# Patient Record
Sex: MALE | Race: WHITE | HISPANIC OR LATINO | URBAN - METROPOLITAN AREA
[De-identification: names, ages, dates, MRNs, and addresses within clinical notes are randomized per-mention and may not be internally consistent; named-entity substitution may affect disease eponyms.]

---

## 2020-11-09 ENCOUNTER — LAB OUTSIDE AN ENCOUNTER (OUTPATIENT)
Dept: URBAN - METROPOLITAN AREA CLINIC 54 | Facility: CLINIC | Age: 45
End: 2020-11-09

## 2020-11-09 ENCOUNTER — OFFICE VISIT (OUTPATIENT)
Dept: URBAN - METROPOLITAN AREA CLINIC 54 | Facility: CLINIC | Age: 45
End: 2020-11-09
Payer: COMMERCIAL

## 2020-11-09 ENCOUNTER — WEB ENCOUNTER (OUTPATIENT)
Dept: URBAN - METROPOLITAN AREA CLINIC 54 | Facility: CLINIC | Age: 45
End: 2020-11-09

## 2020-11-09 DIAGNOSIS — K58.1 IRRITABLE BOWEL SYNDROME WITH CONSTIPATION: ICD-10-CM

## 2020-11-09 DIAGNOSIS — K21.9 GASTROESOPHAGEAL REFLUX DISEASE WITHOUT ESOPHAGITIS: ICD-10-CM

## 2020-11-09 DIAGNOSIS — Z86.19 HISTORY OF HELICOBACTER PYLORI INFECTION: ICD-10-CM

## 2020-11-09 PROCEDURE — G8427 DOCREV CUR MEDS BY ELIG CLIN: HCPCS | Performed by: INTERNAL MEDICINE

## 2020-11-09 PROCEDURE — 91065 BREATH HYDROGEN/METHANE TEST: CPT | Performed by: INTERNAL MEDICINE

## 2020-11-09 PROCEDURE — 1036F TOBACCO NON-USER: CPT | Performed by: INTERNAL MEDICINE

## 2020-11-09 PROCEDURE — 99203 OFFICE O/P NEW LOW 30 MIN: CPT | Performed by: INTERNAL MEDICINE

## 2020-11-09 RX ORDER — LORATADINE 10 MG
1 PACKET MIXED WITH 8 OUNCES OF FLUID TABLET,DISINTEGRATING ORAL ONCE A DAY
Qty: 30 | OUTPATIENT
Start: 2020-11-09 | End: 2020-12-09

## 2020-11-09 RX ORDER — ABACAVIR SULFATE, DOLUTEGRAVIR SODIUM, LAMIVUDINE 600; 50; 300 MG/1; MG/1; MG/1
1 TABLET TABLET, FILM COATED ORAL ONCE A DAY
Status: ACTIVE | COMMUNITY

## 2020-11-09 RX ORDER — DOLUTEGRAVIR SODIUM AND LAMIVUDINE 50; 300 MG/1; MG/1
1 TABLET TABLET, FILM COATED ORAL ONCE A DAY
Status: ACTIVE | COMMUNITY

## 2020-11-09 RX ORDER — PANTOPRAZOLE SODIUM 20 MG/1
1 TABLET TABLET, DELAYED RELEASE ORAL ONCE A DAY
Status: ACTIVE | COMMUNITY

## 2020-11-09 NOTE — HPI-TODAY'S VISIT:
45-year-old [facility] man with HIV on Weinberg therapy, history of back pain status post surgery, history of H. pylori gastritis status post treatment with confirmed eradication, and GERD who presents as a new patient to clinic for constipation and excessive gas. Patient reports for the last 2 months that he has had excessive abdominal distention and bloating.  His bowel frequency has reduced from 2/day to 1/day of formed stool.  He reports urgency.  He also reports smelly stool.  He denies any blood or black tarry stool. Patient has a history of GERD that is well controlled and on pantoprazole.  He is asymptomatic from his heartburn. Patient reports that he had an upper endoscopy in [facility] in 2007 that showed gastritis and H. pylori.  He was subsequently treated with antibiotics.  Reports his PCP checked for H. pylori and it was negative.

## 2020-11-13 LAB — CALPROTECTIN, FECAL: 54

## 2021-01-25 ENCOUNTER — OFFICE VISIT (OUTPATIENT)
Dept: URBAN - METROPOLITAN AREA CLINIC 54 | Facility: CLINIC | Age: 46
End: 2021-01-25

## 2021-02-12 ENCOUNTER — WEB ENCOUNTER (OUTPATIENT)
Dept: URBAN - METROPOLITAN AREA CLINIC 54 | Facility: CLINIC | Age: 46
End: 2021-02-12

## 2021-02-12 ENCOUNTER — OFFICE VISIT (OUTPATIENT)
Dept: URBAN - METROPOLITAN AREA CLINIC 54 | Facility: CLINIC | Age: 46
End: 2021-02-12
Payer: COMMERCIAL

## 2021-02-12 DIAGNOSIS — R68.81 EARLY SATIETY: ICD-10-CM

## 2021-02-12 DIAGNOSIS — K21.9 GASTROESOPHAGEAL REFLUX DISEASE WITHOUT ESOPHAGITIS: ICD-10-CM

## 2021-02-12 DIAGNOSIS — K58.1 IRRITABLE BOWEL SYNDROME WITH CONSTIPATION: ICD-10-CM

## 2021-02-12 PROCEDURE — G9903 PT SCRN TBCO ID AS NON USER: HCPCS | Performed by: INTERNAL MEDICINE

## 2021-02-12 PROCEDURE — 99214 OFFICE O/P EST MOD 30 MIN: CPT | Performed by: INTERNAL MEDICINE

## 2021-02-12 PROCEDURE — G8420 CALC BMI NORM PARAMETERS: HCPCS | Performed by: INTERNAL MEDICINE

## 2021-02-12 PROCEDURE — G8427 DOCREV CUR MEDS BY ELIG CLIN: HCPCS | Performed by: INTERNAL MEDICINE

## 2021-02-12 RX ORDER — ABACAVIR SULFATE, DOLUTEGRAVIR SODIUM, LAMIVUDINE 600; 50; 300 MG/1; MG/1; MG/1
1 TABLET TABLET, FILM COATED ORAL ONCE A DAY
COMMUNITY

## 2021-02-12 RX ORDER — PANTOPRAZOLE SODIUM 20 MG/1
1 TABLET TABLET, DELAYED RELEASE ORAL ONCE A DAY
COMMUNITY

## 2021-02-12 RX ORDER — DOLUTEGRAVIR SODIUM AND LAMIVUDINE 50; 300 MG/1; MG/1
1 TABLET TABLET, FILM COATED ORAL ONCE A DAY
COMMUNITY

## 2021-02-12 RX ORDER — PSYLLIUM SEED (WITH DEXTROSE)
1 PACKET WITH 8 OUNCES OF LIQUID POWDER (GRAM) ORAL QHS
Qty: 30 | Refills: 4 | OUTPATIENT
Start: 2021-02-12 | End: 2021-07-12

## 2021-02-12 NOTE — HPI-TODAY'S VISIT:
45-year-old man with a history of HIV on therapy, IBS-C, GERD, history of H. pylori status post treatment with confirmed eradication who presents to GI clinic for follow-up. Since her last clinic visit patient's been doing much better he has improved his diet and does not suffer from ongoing constipation.  Feels less bloated.  He still reports some early satiety and reflux despite PPI use.  Which he is appropriately taking. Prior GI work-up him including negative fecal calprotectin, negative celiac panel, and a normal CBC.  His last upper endoscopy was in [facility] in 2007 showed gastritis with H. pylori.

## 2021-02-15 ENCOUNTER — TELEPHONE ENCOUNTER (OUTPATIENT)
Dept: URBAN - METROPOLITAN AREA CLINIC 92 | Facility: CLINIC | Age: 46
End: 2021-02-15

## 2021-02-15 RX ORDER — PSYLLIUM SEED (WITH DEXTROSE)
1 PACKET WITH 8 OUNCES OF LIQUID POWDER (GRAM) ORAL QHS
Qty: 30 | Refills: 4
Start: 2021-02-12

## 2021-02-23 PROBLEM — 266435005: Status: ACTIVE | Noted: 2020-11-09

## 2021-02-23 PROBLEM — 440630006: Status: ACTIVE | Noted: 2020-11-09

## 2021-03-18 ENCOUNTER — OFFICE VISIT (OUTPATIENT)
Dept: URBAN - METROPOLITAN AREA SURGERY CENTER 14 | Facility: SURGERY CENTER | Age: 46
End: 2021-03-18

## 2021-08-23 LAB
ENDOMYSIAL ANTIBODY IGA: NEGATIVE
HEMATOCRIT: 43.2
HEMOGLOBIN: 14.7
IMMUNOGLOBULIN A, QN, SERUM: 423
MCH: 31.7
MCHC: 34
MCV: 93
NRBC: (no result)
PLATELETS: 285
RBC: 4.64
RDW: 13
T-TRANSGLUTAMINASE (TTG) IGA: <2
WBC: 8.9

## 2021-10-18 ENCOUNTER — TELEPHONE ENCOUNTER (OUTPATIENT)
Dept: URBAN - METROPOLITAN AREA CLINIC 54 | Facility: CLINIC | Age: 46
End: 2021-10-18

## 2023-06-23 ENCOUNTER — OFFICE VISIT (OUTPATIENT)
Dept: URBAN - METROPOLITAN AREA CLINIC 54 | Facility: CLINIC | Age: 48
End: 2023-06-23
Payer: COMMERCIAL

## 2023-06-23 ENCOUNTER — OFFICE VISIT (OUTPATIENT)
Dept: URBAN - METROPOLITAN AREA CLINIC 54 | Facility: CLINIC | Age: 48
End: 2023-06-23

## 2023-06-23 VITALS
SYSTOLIC BLOOD PRESSURE: 132 MMHG | BODY MASS INDEX: 31.25 KG/M2 | DIASTOLIC BLOOD PRESSURE: 80 MMHG | HEART RATE: 80 BPM | TEMPERATURE: 98.2 F | WEIGHT: 211 LBS | HEIGHT: 69 IN

## 2023-06-23 DIAGNOSIS — K21.9 CHRONIC GERD: ICD-10-CM

## 2023-06-23 DIAGNOSIS — Z86.19 HISTORY OF HELICOBACTER PYLORI INFECTION: ICD-10-CM

## 2023-06-23 DIAGNOSIS — Z12.11 SCREENING FOR COLON CANCER: ICD-10-CM

## 2023-06-23 PROCEDURE — 99214 OFFICE O/P EST MOD 30 MIN: CPT | Performed by: INTERNAL MEDICINE

## 2023-06-23 RX ORDER — ABACAVIR SULFATE, DOLUTEGRAVIR SODIUM, LAMIVUDINE 600; 50; 300 MG/1; MG/1; MG/1
1 TABLET TABLET, FILM COATED ORAL ONCE A DAY
Status: DISCONTINUED | COMMUNITY

## 2023-06-23 RX ORDER — PANTOPRAZOLE SODIUM 20 MG/1
1 TABLET TABLET, DELAYED RELEASE ORAL ONCE A DAY
COMMUNITY

## 2023-06-23 RX ORDER — PSYLLIUM SEED (WITH DEXTROSE)
1 PACKET WITH 8 OUNCES OF LIQUID POWDER (GRAM) ORAL QHS
Qty: 30 | Refills: 4 | Status: DISCONTINUED | COMMUNITY
Start: 2021-02-12

## 2023-06-23 RX ORDER — DOLUTEGRAVIR SODIUM AND LAMIVUDINE 50; 300 MG/1; MG/1
1 TABLET TABLET, FILM COATED ORAL ONCE A DAY
COMMUNITY

## 2023-06-23 RX ORDER — ABACAVIR SULFATE, DOLUTEGRAVIR SODIUM, LAMIVUDINE 600; 50; 300 MG/1; MG/1; MG/1
1 TABLET TABLET, FILM COATED ORAL ONCE A DAY
COMMUNITY

## 2023-06-23 RX ORDER — SODIUM, POTASSIUM,MAG SULFATES 17.5-3.13G
177ML SOLUTION, RECONSTITUTED, ORAL ORAL
Qty: 1 | OUTPATIENT
Start: 2023-06-23 | End: 2023-06-25

## 2023-06-23 RX ORDER — PSYLLIUM SEED (WITH DEXTROSE)
1 PACKET WITH 8 OUNCES OF LIQUID POWDER (GRAM) ORAL QHS
Qty: 30 | Refills: 4 | COMMUNITY
Start: 2021-02-12

## 2023-06-23 NOTE — HPI-TODAY'S VISIT:
47-year-old man with history of HIV well-controlled on antiviral therapy, IBS-C, GERD, H. pylori status post treatment with confirmed eradication who presents to GI clinic for persistent GERD despite PPI therapy.  Patient takes pantoprazole 40 mg once daily in the morning.  Continues to have low-grade reflux.  He would like to know why he continues to have reflux despite taking his medication appropriately.  He denies any dysphagia.  Denies any unintentional weight loss.  He was scheduled for an upper endoscopy but did not make that appointment due to the lack of .  Patient also suffer from IBS-C currently on on Metamucil his bowel movements are 1 to 2-day.  Denies any blood in his stool.  Denies any prior colonoscopy.  Denies any family history of colon cancer.  Prior work-up included negative celiac panel negative fecal calprotectin his last upper endoscopy in 2007 was in [facility] which identified H. pylori gastritis.

## 2023-07-17 ENCOUNTER — CLAIMS CREATED FROM THE CLAIM WINDOW (OUTPATIENT)
Dept: URBAN - METROPOLITAN AREA SURGERY CENTER 14 | Facility: SURGERY CENTER | Age: 48
End: 2023-07-17

## 2023-07-17 ENCOUNTER — TELEPHONE ENCOUNTER (OUTPATIENT)
Dept: URBAN - METROPOLITAN AREA CLINIC 54 | Facility: CLINIC | Age: 48
End: 2023-07-17

## 2023-07-17 ENCOUNTER — LAB OUTSIDE AN ENCOUNTER (OUTPATIENT)
Dept: URBAN - METROPOLITAN AREA CLINIC 54 | Facility: CLINIC | Age: 48
End: 2023-07-17

## 2023-07-17 ENCOUNTER — CLAIMS CREATED FROM THE CLAIM WINDOW (OUTPATIENT)
Dept: URBAN - METROPOLITAN AREA SURGERY CENTER 14 | Facility: SURGERY CENTER | Age: 48
End: 2023-07-17
Payer: COMMERCIAL

## 2023-07-17 DIAGNOSIS — Z12.11 COLON CANCER SCREENING: ICD-10-CM

## 2023-07-17 PROCEDURE — 45378 DIAGNOSTIC COLONOSCOPY: CPT | Performed by: STUDENT IN AN ORGANIZED HEALTH CARE EDUCATION/TRAINING PROGRAM

## 2023-07-17 PROCEDURE — G8907 PT DOC NO EVENTS ON DISCHARG: HCPCS | Performed by: STUDENT IN AN ORGANIZED HEALTH CARE EDUCATION/TRAINING PROGRAM

## 2023-07-17 RX ORDER — PANTOPRAZOLE SODIUM 20 MG/1
1 TABLET TABLET, DELAYED RELEASE ORAL ONCE A DAY
COMMUNITY

## 2023-07-17 RX ORDER — DOLUTEGRAVIR SODIUM AND LAMIVUDINE 50; 300 MG/1; MG/1
1 TABLET TABLET, FILM COATED ORAL ONCE A DAY
COMMUNITY

## 2023-07-26 ENCOUNTER — OFFICE VISIT (OUTPATIENT)
Dept: URBAN - METROPOLITAN AREA SURGERY CENTER 14 | Facility: SURGERY CENTER | Age: 48
End: 2023-07-26
Payer: COMMERCIAL

## 2023-07-26 DIAGNOSIS — K29.60 OTHER GASTRITIS WITHOUT BLEEDING: ICD-10-CM

## 2023-07-26 PROCEDURE — G8907 PT DOC NO EVENTS ON DISCHARG: HCPCS | Performed by: STUDENT IN AN ORGANIZED HEALTH CARE EDUCATION/TRAINING PROGRAM

## 2023-07-26 PROCEDURE — 43239 EGD BIOPSY SINGLE/MULTIPLE: CPT | Performed by: STUDENT IN AN ORGANIZED HEALTH CARE EDUCATION/TRAINING PROGRAM

## 2023-08-07 ENCOUNTER — DASHBOARD ENCOUNTERS (OUTPATIENT)
Age: 48
End: 2023-08-07

## 2023-08-07 ENCOUNTER — OFFICE VISIT (OUTPATIENT)
Dept: URBAN - METROPOLITAN AREA CLINIC 54 | Facility: CLINIC | Age: 48
End: 2023-08-07
Payer: COMMERCIAL

## 2023-08-07 VITALS
HEIGHT: 69 IN | TEMPERATURE: 97.3 F | SYSTOLIC BLOOD PRESSURE: 112 MMHG | BODY MASS INDEX: 31.46 KG/M2 | DIASTOLIC BLOOD PRESSURE: 74 MMHG | HEART RATE: 80 BPM | WEIGHT: 212.4 LBS

## 2023-08-07 DIAGNOSIS — K21.9 CHRONIC GERD: ICD-10-CM

## 2023-08-07 DIAGNOSIS — Z86.19 HISTORY OF HELICOBACTER PYLORI INFECTION: ICD-10-CM

## 2023-08-07 PROCEDURE — 99213 OFFICE O/P EST LOW 20 MIN: CPT

## 2023-08-07 RX ORDER — DOLUTEGRAVIR SODIUM AND LAMIVUDINE 50; 300 MG/1; MG/1
1 TABLET TABLET, FILM COATED ORAL ONCE A DAY
Status: ACTIVE | COMMUNITY

## 2023-08-07 RX ORDER — PANTOPRAZOLE SODIUM 40 MG/1
1 TABLET TABLET, DELAYED RELEASE ORAL ONCE A DAY
Status: ACTIVE | COMMUNITY

## 2023-08-07 RX ORDER — PANTOPRAZOLE SODIUM 40 MG/1
1 TABLET TABLET, DELAYED RELEASE ORAL ONCE A DAY
OUTPATIENT

## 2023-08-07 NOTE — HPI-TODAY'S VISIT:
6/30/23: 47-year-old man with history of HIV well-controlled on antiviral therapy, IBS-C, GERD, H. pylori status post treatment with confirmed eradication who presents to GI clinic for persistent GERD despite PPI therapy.  Patient takes pantoprazole 40 mg once daily in the morning.  Continues to have low-grade reflux.  He would like to know why he continues to have reflux despite taking his medication appropriately.  He denies any dysphagia.  Denies any unintentional weight loss.  He was scheduled for an upper endoscopy but did not make that appointment due to the lack of .  Patient also suffer from IBS-C currently on on Metamucil his bowel movements are 1 to 2-day.  Denies any blood in his stool.  Denies any prior colonoscopy.  Denies any family history of colon cancer.  Prior work-up included negative celiac panel negative fecal calprotectin his last upper endoscopy in 2007 was in [facility] which identified H. pylori gastritis.  8/7/23 Follow up: Pt presents for EGD and cscope follow up. Doing well without complaints. Reflux has resolved since last visit. Pt states has figured out his food triggers (milk, grains) and as long as he avoids them he feels fine. Wants to get off pantoprazole. Cscope was normal.  EGD 7/17/23: - Z-line irregular, 36 cm from the incisors. - Normal stomach. Biopsied. - Normal examined duodenum. - No gross findings observed to explain patient's symptoms. Biopsy: Mild, chronic inactive gastritis. No H pylori.  Colonoscopy 7/17/23: - Internal hemorrhoids. - The examination was otherwise normal. - No specimens collected.

## 2024-11-13 ENCOUNTER — TELEPHONE ENCOUNTER (OUTPATIENT)
Dept: URBAN - METROPOLITAN AREA CLINIC 54 | Facility: CLINIC | Age: 49
End: 2024-11-13

## 2024-11-15 ENCOUNTER — OFFICE VISIT (OUTPATIENT)
Dept: URBAN - METROPOLITAN AREA CLINIC 54 | Facility: CLINIC | Age: 49
End: 2024-11-15